# Patient Record
Sex: MALE | URBAN - METROPOLITAN AREA
[De-identification: names, ages, dates, MRNs, and addresses within clinical notes are randomized per-mention and may not be internally consistent; named-entity substitution may affect disease eponyms.]

---

## 2023-12-28 ENCOUNTER — OFFICE VISIT (OUTPATIENT)
Dept: URGENT CARE | Facility: CLINIC | Age: 18
End: 2023-12-28
Payer: COMMERCIAL

## 2023-12-28 VITALS
OXYGEN SATURATION: 98 % | TEMPERATURE: 98.5 F | SYSTOLIC BLOOD PRESSURE: 120 MMHG | HEART RATE: 86 BPM | RESPIRATION RATE: 18 BRPM | HEIGHT: 76 IN | BODY MASS INDEX: 33.44 KG/M2 | DIASTOLIC BLOOD PRESSURE: 70 MMHG | WEIGHT: 274.6 LBS

## 2023-12-28 DIAGNOSIS — J02.8 SORE THROAT (VIRAL): ICD-10-CM

## 2023-12-28 DIAGNOSIS — B97.89 SORE THROAT (VIRAL): ICD-10-CM

## 2023-12-28 LAB — S PYO DNA SPEC NAA+PROBE: NOT DETECTED

## 2023-12-28 PROCEDURE — 87651 STREP A DNA AMP PROBE: CPT | Performed by: REGISTERED NURSE

## 2023-12-28 PROCEDURE — 3074F SYST BP LT 130 MM HG: CPT | Performed by: REGISTERED NURSE

## 2023-12-28 PROCEDURE — 3078F DIAST BP <80 MM HG: CPT | Performed by: REGISTERED NURSE

## 2023-12-28 PROCEDURE — 99213 OFFICE O/P EST LOW 20 MIN: CPT | Performed by: REGISTERED NURSE

## 2023-12-28 ASSESSMENT — ENCOUNTER SYMPTOMS
HEADACHES: 0
COUGH: 1
CHILLS: 0
SORE THROAT: 1
DIZZINESS: 0
FEVER: 0

## 2023-12-29 NOTE — PROGRESS NOTES
"Subjective:   Fermin Bazan is a 18 y.o. male who presents for Sore Throat (X 4 days, sore throat, LT ear discomfort, requesting strep test. )    HPI  Presenting with 4 days sore throat and now causing left ear pain.     They are using nothing for symptoms.  Mother does note patient is a bit of a hypochondriac.  Has been concerned with things ranging from HIV, thrush, STDs in addition to strep.    Sick exposures? None.     Pertinent medical hx? none.     Tolerating PO. No recent antibiotic use in the past 3 months. Immunizations are reported as current. Denies difficulty opening mouth, muffled voice, drooling, decreased ROM neck.    [phone]    Review of Systems   Constitutional:  Negative for chills and fever.   HENT:  Positive for ear pain and sore throat.    Respiratory:  Positive for cough.    Cardiovascular:  Negative for chest pain.   Skin:  Negative for rash.   Neurological:  Negative for dizziness and headaches.     Medications, Allergies, and current problem list reviewed today in Epic.     Objective:     /70   Pulse 86   Temp 36.9 °C (98.5 °F) (Temporal)   Resp 18   Ht 1.918 m (6' 3.5\")   Wt 125 kg (274 lb 9.6 oz)   SpO2 98%     Physical Exam  Vitals and nursing note reviewed.   Constitutional:       Appearance: Normal appearance. He is not ill-appearing or toxic-appearing.   HENT:      Head: Normocephalic.      Right Ear: Tympanic membrane normal.      Left Ear: Tympanic membrane normal.      Mouth/Throat:      Mouth: Mucous membranes are moist.      Comments: PND  Eyes:      Pupils: Pupils are equal, round, and reactive to light.   Cardiovascular:      Rate and Rhythm: Normal rate and regular rhythm.      Heart sounds: No murmur heard.  Pulmonary:      Effort: Pulmonary effort is normal. No respiratory distress.      Breath sounds: Normal breath sounds.   Musculoskeletal:         General: Normal range of motion.      Cervical back: Normal range of motion.   Skin:     General: Skin is warm " and dry.      Capillary Refill: Capillary refill takes less than 2 seconds.      Findings: No rash.   Neurological:      General: No focal deficit present.      Mental Status: He is alert and oriented to person, place, and time.   Psychiatric:         Mood and Affect: Mood normal.         Lab Results/POC Test Results   Cepheid strep negative       Assessment/Plan:     Differential diagnosis discussed     1. Sore throat (viral)  POCT GROUP A STREP, PCR        Cold-like symptoms for 4 days but sore throat seems to be more prominent.  No red flag signs or symptoms.  No known exposures but does attend college and is in a fraternity.  Does have a history of allergies not using meds.  Does have some slight health anxiety with concerns related to HIV, thrush and STDs I did offer reassurance.  Vitals are reassuring.  Exam is benign with the exception of postnasal drainage noted in the posterior OP.  Cepheid strep negative.  Discussed starting Flonase, Zyrtec.  OTC cold and decongestant medication.  Adequate hydration.  Salt water gargling.  Monitor symptoms closely.    Return to clinic or go to ED if symptoms worsen or persist. Indications for ED discussed at length. Red flag symptoms discussed. All side effects of medication discussed including allergic response, GI upset, tendon injury, rash, sedation etc. Patient and/or guardian voices understanding.     I personally reviewed prior external notes and test results pertinent to today's visit as well as additional imaging and testing completed in clinic today.     Please note that this dictation was created using voice recognition software. I have made every reasonable attempt to correct obvious errors, but I expect that there are errors of grammar and possibly content that I did not discover before finalizing the note.    This note was electronically signed by JR James

## 2024-05-18 ENCOUNTER — HOSPITAL ENCOUNTER (OUTPATIENT)
Facility: MEDICAL CENTER | Age: 19
End: 2024-05-18
Attending: PHYSICIAN ASSISTANT
Payer: COMMERCIAL

## 2024-05-18 ENCOUNTER — OFFICE VISIT (OUTPATIENT)
Dept: URGENT CARE | Facility: CLINIC | Age: 19
End: 2024-05-18
Payer: COMMERCIAL

## 2024-05-18 VITALS
WEIGHT: 277 LBS | HEART RATE: 97 BPM | SYSTOLIC BLOOD PRESSURE: 116 MMHG | HEIGHT: 76 IN | TEMPERATURE: 98.2 F | OXYGEN SATURATION: 97 % | DIASTOLIC BLOOD PRESSURE: 74 MMHG | RESPIRATION RATE: 16 BRPM | BODY MASS INDEX: 33.73 KG/M2

## 2024-05-18 DIAGNOSIS — J02.9 SORE THROAT: ICD-10-CM

## 2024-05-18 DIAGNOSIS — Z11.3 SCREENING FOR STDS (SEXUALLY TRANSMITTED DISEASES): ICD-10-CM

## 2024-05-18 LAB
FLUAV RNA SPEC QL NAA+PROBE: NEGATIVE
FLUBV RNA SPEC QL NAA+PROBE: NEGATIVE
RSV RNA SPEC QL NAA+PROBE: NEGATIVE
S PYO DNA SPEC NAA+PROBE: NOT DETECTED
SARS-COV-2 RNA RESP QL NAA+PROBE: NEGATIVE

## 2024-05-18 PROCEDURE — 99213 OFFICE O/P EST LOW 20 MIN: CPT | Performed by: PHYSICIAN ASSISTANT

## 2024-05-18 PROCEDURE — 87651 STREP A DNA AMP PROBE: CPT | Performed by: PHYSICIAN ASSISTANT

## 2024-05-18 PROCEDURE — 0241U POCT CEPHEID COV-2, FLU A/B, RSV - PCR: CPT | Performed by: PHYSICIAN ASSISTANT

## 2024-05-18 PROCEDURE — 3078F DIAST BP <80 MM HG: CPT | Performed by: PHYSICIAN ASSISTANT

## 2024-05-18 PROCEDURE — 3074F SYST BP LT 130 MM HG: CPT | Performed by: PHYSICIAN ASSISTANT

## 2024-05-18 RX ORDER — DEXAMETHASONE SODIUM PHOSPHATE 10 MG/ML
10 INJECTION INTRAMUSCULAR; INTRAVENOUS ONCE
Status: COMPLETED | OUTPATIENT
Start: 2024-05-18 | End: 2024-05-18

## 2024-05-18 RX ADMIN — DEXAMETHASONE SODIUM PHOSPHATE 10 MG: 10 INJECTION INTRAMUSCULAR; INTRAVENOUS at 18:56

## 2024-05-18 ASSESSMENT — ENCOUNTER SYMPTOMS
SORE THROAT: 1
FEVER: 0
MYALGIAS: 1
EYE REDNESS: 0
HEADACHES: 1
NAUSEA: 0
COUGH: 0
EYE DISCHARGE: 0
VOMITING: 0
TROUBLE SWALLOWING: 1
DIARRHEA: 0
SHORTNESS OF BREATH: 0

## 2024-05-19 DIAGNOSIS — J02.9 SORE THROAT: ICD-10-CM

## 2024-05-19 NOTE — PROGRESS NOTES
Subjective     Fermin Bazan is a 18 y.o. male who presents with Pharyngitis (Started last night, Difficulty swallowing, Back pain, headache, stomach ache)            Pharyngitis   This is a new problem. The current episode started today. The problem has been unchanged. Neither side of throat is experiencing more pain than the other. There has been no fever. The pain is mild. Associated symptoms include headaches and trouble swallowing (The patient reports increased pain with swallowing.). Pertinent negatives include no congestion, coughing, diarrhea, drooling, ear pain, shortness of breath or vomiting. He has had no exposure to strep. He has tried acetaminophen for the symptoms.     PMH:  has no past medical history on file.  MEDS:   Current Outpatient Medications:     methylphenidate (RITALIN) 10 MG Tab, Take 10 mg by mouth 2 times a day. (Patient not taking: Reported on 12/28/2023), Disp: , Rfl:     therapeutic multivitamin-minerals (THERAGRAN-M) Tab, Take 1 Tab by mouth every day. (Patient not taking: Reported on 12/28/2023), Disp: , Rfl:   ALLERGIES:   Allergies   Allergen Reactions    Skin Adhesives Rash     Dermabond     SURGHX: No past surgical history on file.  SOCHX:  reports that he has never smoked. He has never used smokeless tobacco. He reports that he does not currently use alcohol. He reports current drug use. Drugs: Inhaled and Marijuana.  FH: Family history was reviewed, no pertinent findings to report      Review of Systems   Constitutional:  Negative for fever.   HENT:  Positive for sore throat and trouble swallowing (The patient reports increased pain with swallowing.). Negative for congestion, drooling and ear pain.    Eyes:  Negative for discharge and redness.   Respiratory:  Negative for cough and shortness of breath.    Cardiovascular:  Negative for chest pain.   Gastrointestinal:  Negative for diarrhea, nausea and vomiting.   Musculoskeletal:  Positive for myalgias.   Neurological:  Positive  "for headaches.              Objective     /74   Pulse 97   Temp 36.8 °C (98.2 °F) (Temporal)   Resp 16   Ht 1.93 m (6' 4\")   Wt (!) 126 kg (277 lb)   SpO2 97%   BMI 33.72 kg/m²      Physical Exam  Constitutional:       General: He is not in acute distress.     Appearance: Normal appearance. He is not ill-appearing.   HENT:      Head: Normocephalic and atraumatic.      Right Ear: Tympanic membrane, ear canal and external ear normal.      Left Ear: Tympanic membrane, ear canal and external ear normal.      Nose: Nose normal.      Mouth/Throat:      Mouth: Mucous membranes are moist.      Pharynx: Oropharynx is clear. Uvula midline. Posterior oropharyngeal erythema present.      Tonsils: Tonsillar exudate present.   Eyes:      Extraocular Movements: Extraocular movements intact.      Conjunctiva/sclera: Conjunctivae normal.   Cardiovascular:      Rate and Rhythm: Normal rate and regular rhythm.      Heart sounds: Normal heart sounds.   Pulmonary:      Effort: Pulmonary effort is normal. No respiratory distress.      Breath sounds: Normal breath sounds. No wheezing.   Musculoskeletal:         General: Normal range of motion.      Cervical back: Normal range of motion and neck supple.   Skin:     General: Skin is warm and dry.   Neurological:      Mental Status: He is alert and oriented to person, place, and time.                Progress:  Results for orders placed or performed in visit on 05/18/24   POCT GROUP A STREP, PCR   Result Value Ref Range    POC Group A Strep, PCR Not Detected Not Detected, Invalid   POCT CoV-2, Flu A/B, RSV by PCR   Result Value Ref Range    SARS-CoV-2 by PCR Negative Negative, Invalid    Influenza virus A RNA Negative Negative, Invalid    Influenza virus B, PCR Negative Negative, Invalid    RSV, PCR Negative Negative, Invalid       Decadron 10mg PO given in clinic.     Throat Culture - pending     HIV Screening - pending              Assessment & Plan        1. Sore throat  - " POCT GROUP A STREP, PCR  - POCT CoV-2, Flu A/B, RSV by PCR  - dexamethasone (Decadron) injection (check route below) 10 mg  - CULTURE THROAT; Future    2. Screening for STDs (sexually transmitted diseases)  - HIV AG/AB COMBO ASSAY SCREENING; Future    The patient's presenting symptoms and physical exam findings are consistent with a sore throat.  The patient's POC Cepheid group A strep PCR testing today in clinic was negative.  The patient's POCT Cepheid viral testing was also negative for COVID-19, influenza, and RSV.  Will culture the patient's throat to rule out other possible bacterial sources.  Discussed likely viral etiology with the patient.  The patient is concerned his viral symptoms could be related to possible HIV.  Patient reports no known exposure to HIV, but would like to be tested/screened at this time.  Will order HIV screening for the patient as requested, and will contact the patient with results.  Advised the patient to monitor for worsening signs or symptoms.  Recommend OTC medications and supportive care for symptomatic management.  Recommend the patient follow-up with primary care.  Discussed return precautions with the patient, and he verbalized understanding.      Differential diagnoses, supportive care, and indications for immediate follow-up discussed with patient.   Instructed to return to clinic or nearest emergency department for any change in condition, further concerns, or worsening of symptoms.    OTC Tylenol or Motrin for fever/discomfort.  OTC Supportive Care for Sore Throat - warm salt water gargles, sore throat lozenges, warm lemon water, and/or tea.  Drink plenty of fluids  Follow-up with PCP   Return to clinic or go to the ED if symptoms worsen or fail to improve, or if patient should develop worsening/increasing sore throat, difficulty swallowing, drooling, change in voice, swollen glands, shortness of breath, ear pain, cough, congestion, fever/chills, and/or any concerning  symptoms.    Discussed plan with the patient, and he agrees to the above.    I personally reviewed prior external notes and test results pertinent to today's visit.  I have independently reviewed and interpreted all diagnostics ordered during this urgent care visit.     Please note that this dictation was created using voice recognition software. I have made every reasonable attempt to correct obvious errors, but I expect that there may be errors of grammar and possibly content that I did not discover before finalizing the note.     This note was electronically signed by Deja Casillas PA-C

## 2024-05-21 LAB
BACTERIA SPEC RESP CULT: NORMAL
SIGNIFICANT IND 70042: NORMAL
SITE SITE: NORMAL
SOURCE SOURCE: NORMAL